# Patient Record
Sex: MALE | Race: BLACK OR AFRICAN AMERICAN | NOT HISPANIC OR LATINO | Employment: OTHER | URBAN - METROPOLITAN AREA
[De-identification: names, ages, dates, MRNs, and addresses within clinical notes are randomized per-mention and may not be internally consistent; named-entity substitution may affect disease eponyms.]

---

## 2024-04-05 ENCOUNTER — HOSPITAL ENCOUNTER (EMERGENCY)
Facility: HOSPITAL | Age: 39
Discharge: HOME | End: 2024-04-05
Attending: STUDENT IN AN ORGANIZED HEALTH CARE EDUCATION/TRAINING PROGRAM
Payer: MEDICAID

## 2024-04-05 VITALS
OXYGEN SATURATION: 99 % | WEIGHT: 245 LBS | DIASTOLIC BLOOD PRESSURE: 105 MMHG | HEIGHT: 75 IN | HEART RATE: 85 BPM | TEMPERATURE: 97 F | RESPIRATION RATE: 20 BRPM | BODY MASS INDEX: 30.46 KG/M2 | SYSTOLIC BLOOD PRESSURE: 155 MMHG

## 2024-04-05 DIAGNOSIS — Z13.30 ENCOUNTER FOR BEHAVIORAL HEALTH SCREENING: Primary | ICD-10-CM

## 2024-04-05 PROCEDURE — 99281 EMR DPT VST MAYX REQ PHY/QHP: CPT

## 2024-04-06 NOTE — DISCHARGE INSTRUCTIONS
Please follow up with your outpatient providers.  Return to the ER with any new or worsening symptoms.

## 2024-04-06 NOTE — ED PROVIDER NOTES
Encounter Date: 4/5/2024       History     Chief Complaint   Patient presents with    Psychiatric Evaluation     OPC with [facility] after release from [facility]. Pt denies SI/HI, AV hallucinations     Patient presents to the emergency department for psychiatric evaluation.  He was just released from halfway today by police.  It was an OPC filled out by his mother that patient was delusional, that he bought a police car and thinks he is a .  Otherwise no delusions were noted.  On my evaluation, patient is awake and alert, appropriate, calm, cooperative.  He states that he was released from halfway today by police, he was mother has a restraining order on him.  He states he does have a history of PTSD and bipolar disorder.  He states he is not currently suicidal or homicidal, and has a no hallucinations.  He reports he did buy a police car, but states it was because he wanted a police car, he does not think he is actually law enforcement.  He reports previous voluntary inpatient psychiatric stays due to stress, but states currently he feels fine.    The history is provided by the patient, the police and medical records.     Review of patient's allergies indicates:  No Known Allergies  No past medical history on file.  No past surgical history on file.  No family history on file.     Review of Systems   Constitutional:  Negative for chills and fever.   HENT:  Negative for congestion and sore throat.    Respiratory:  Negative for cough and shortness of breath.    Cardiovascular:  Negative for chest pain and palpitations.   Gastrointestinal:  Negative for abdominal pain and nausea.   Genitourinary:  Negative for dysuria and hematuria.   Musculoskeletal:  Negative for arthralgias and myalgias.   Neurological:  Negative for dizziness and weakness.       Physical Exam     Initial Vitals [04/05/24 1926]   BP Pulse Resp Temp SpO2   (!) 155/105 85 20 97.2 °F (36.2 °C) 99 %      MAP       --         Physical Exam    Nursing note and  vitals reviewed.  Constitutional: He appears well-developed and well-nourished.   HENT:   Head: Normocephalic and atraumatic.   Eyes: Conjunctivae are normal. Pupils are equal, round, and reactive to light.   Neck: Neck supple.   Normal range of motion.  Cardiovascular:  Normal rate and regular rhythm.           Pulmonary/Chest: Breath sounds normal. No respiratory distress.   Abdominal: Abdomen is soft. There is no abdominal tenderness.   Musculoskeletal:         General: No edema. Normal range of motion.      Cervical back: Normal range of motion and neck supple.     Neurological: He is alert and oriented to person, place, and time.   Skin: Skin is warm and dry.         ED Course   Procedures  Labs Reviewed - No data to display       Imaging Results    None          Medications - No data to display  Medical Decision Making  Vital signs are stable.  Patient was calm, cooperative and appropriate.  Denying any SI, HI or hallucinations.  He appears to have good judgment in his normal train of thought.  He was good insight into his psychiatric history.  He does not appear delusional or threat to himself or others at this point.  I see no indication for PEC, will discharge.                                      Clinical Impression:  Final diagnoses:  [Z13.30] Encounter for behavioral health screening (Primary)          ED Disposition Condition    Discharge Stable          ED Prescriptions    None       Follow-up Information       Follow up With Specialties Details Why Contact Info    [facility] - Emergency Dept Emergency Medicine Go to  If symptoms worsen [address] [facility] [id number] [phone]             Aidan Boone MD  04/05/24 8106